# Patient Record
Sex: FEMALE | Race: WHITE | NOT HISPANIC OR LATINO | Employment: FULL TIME | URBAN - METROPOLITAN AREA
[De-identification: names, ages, dates, MRNs, and addresses within clinical notes are randomized per-mention and may not be internally consistent; named-entity substitution may affect disease eponyms.]

---

## 2023-12-08 ENCOUNTER — OFFICE VISIT (OUTPATIENT)
Dept: FAMILY MEDICINE CLINIC | Facility: CLINIC | Age: 33
End: 2023-12-08
Payer: COMMERCIAL

## 2023-12-08 VITALS
SYSTOLIC BLOOD PRESSURE: 108 MMHG | BODY MASS INDEX: 23.55 KG/M2 | TEMPERATURE: 98 F | DIASTOLIC BLOOD PRESSURE: 70 MMHG | OXYGEN SATURATION: 99 % | HEART RATE: 58 BPM | HEIGHT: 62 IN | RESPIRATION RATE: 12 BRPM | WEIGHT: 128 LBS

## 2023-12-08 DIAGNOSIS — L30.9 ECZEMA, UNSPECIFIED TYPE: Primary | ICD-10-CM

## 2023-12-08 DIAGNOSIS — K21.9 GASTROESOPHAGEAL REFLUX DISEASE WITHOUT ESOPHAGITIS: ICD-10-CM

## 2023-12-08 DIAGNOSIS — F41.1 GAD (GENERALIZED ANXIETY DISORDER): ICD-10-CM

## 2023-12-08 RX ORDER — OMEPRAZOLE 40 MG/1
40 CAPSULE, DELAYED RELEASE ORAL DAILY
Start: 2023-12-08

## 2023-12-08 RX ORDER — FLUOXETINE HYDROCHLORIDE 20 MG/1
20 CAPSULE ORAL DAILY
Start: 2023-12-08

## 2023-12-08 RX ORDER — FLUOXETINE HYDROCHLORIDE 20 MG/1
20 CAPSULE ORAL DAILY
COMMUNITY
Start: 2023-09-21 | End: 2023-12-08 | Stop reason: SDUPTHER

## 2023-12-08 RX ORDER — OMEPRAZOLE 40 MG/1
40 CAPSULE, DELAYED RELEASE ORAL DAILY
COMMUNITY
End: 2023-12-08 | Stop reason: SDUPTHER

## 2023-12-08 RX ORDER — MOMETASONE FUROATE 1 MG/G
1 CREAM TOPICAL DAILY
Qty: 45 G | Refills: 1 | Status: SHIPPED | OUTPATIENT
Start: 2023-12-08

## 2023-12-08 NOTE — PROGRESS NOTES
Date: 2023   Patient Name: Aris Leary  : 1990   MRN: [id number]     Chief Complaint:    Chief Complaint   Patient presents with    Establish Care       History of Present Illness: Aris Leary is a 33 y.o. female who is here today to establish care.  Patient is overall doing well.  She is taking her medications as prescribed for generalized anxiety and acid reflux.  She is also needing a refill on her mometasone cream she uses this for eczema cream.  She denies any acute complaints today in clinic.  She is  to same-sex partner.  They are in the process of fertility to carry a baby.  She is due for a Pap smear but is going to talk to fertility clinic to see if they will do her Pap smear for her.  She is not due for blood work at this time and fertility clinic will be faxing blood work from previous visit to me.          Review of Systems:   Review of Systems   Constitutional:  Negative for activity change, appetite change, fatigue, unexpected weight gain and unexpected weight loss.   HENT:  Negative for congestion, dental problem, ear pain, hearing loss, trouble swallowing and voice change.    Eyes:  Negative for blurred vision, pain and visual disturbance.   Respiratory:  Negative for apnea, cough, chest tightness, shortness of breath and wheezing.    Cardiovascular:  Negative for chest pain, palpitations and leg swelling.   Gastrointestinal:  Negative for abdominal pain, constipation, diarrhea, nausea and GERD.   Endocrine: Negative for polydipsia, polyphagia and polyuria.   Genitourinary:  Negative for decreased libido, dysuria, frequency, hematuria and urinary incontinence.   Musculoskeletal:  Negative for arthralgias, back pain, gait problem, joint swelling, myalgias and neck pain.   Skin:  Negative for dry skin, rash, skin lesions, wound and bruise.   Allergic/Immunologic: Negative for environmental allergies and food allergies.   Neurological:  Negative for dizziness, seizures,  "speech difficulty, weakness, headache, memory problem and confusion.   Psychiatric/Behavioral:  Negative for agitation, behavioral problems, sleep disturbance, depressed mood and stress. The patient is not nervous/anxious.        Past Medical History:   Past Medical History:   Diagnosis Date    Anxiety     Depression     GERD (gastroesophageal reflux disease)        Past Surgical History: History reviewed. No pertinent surgical history.    Family History: History reviewed. No pertinent family history.    Social History:   Social History     Socioeconomic History    Marital status:    Tobacco Use    Smoking status: Never    Smokeless tobacco: Never   Vaping Use    Vaping Use: Never used   Substance and Sexual Activity    Alcohol use: Yes     Comment: Socially    Drug use: Never    Sexual activity: Defer       Medications:     Current Outpatient Medications:     FLUoxetine (PROzac) 20 MG capsule, Take 1 capsule by mouth Daily., Disp: , Rfl:     omeprazole (priLOSEC) 40 MG capsule, Take 1 capsule by mouth Daily., Disp: , Rfl:     mometasone (ELOCON) 0.1 % cream, Apply 1 application  topically to the appropriate area as directed Daily., Disp: 45 g, Rfl: 1    Allergies:   No Known Allergies    PHQ-9 Total Score: 0     Physical Exam:  Vital Signs:   Vitals:    12/08/23 1210   BP: 108/70   Pulse: 58   Resp: 12   Temp: 98 °F (36.7 °C)   SpO2: 99%   Weight: 58.1 kg (128 lb)   Height: 157.5 cm (62\")     Body mass index is 23.41 kg/m².   BMI is within normal parameters. No other follow-up for BMI required.       Physical Exam  Vitals and nursing note reviewed.   Constitutional:       General: She is awake.      Appearance: Normal appearance. She is well-developed.   HENT:      Head: Normocephalic and atraumatic.      Right Ear: Tympanic membrane, ear canal and external ear normal.      Left Ear: Tympanic membrane, ear canal and external ear normal.      Nose: Nose normal.      Mouth/Throat:      Mouth: Mucous membranes " are moist.      Pharynx: Oropharynx is clear.   Eyes:      Extraocular Movements: Extraocular movements intact.      Conjunctiva/sclera: Conjunctivae normal.      Pupils: Pupils are equal, round, and reactive to light.   Cardiovascular:      Rate and Rhythm: Normal rate and regular rhythm.      Pulses: Normal pulses.      Heart sounds: Normal heart sounds.   Pulmonary:      Effort: Pulmonary effort is normal.      Breath sounds: Normal breath sounds.   Abdominal:      General: Bowel sounds are normal.      Palpations: Abdomen is soft.   Musculoskeletal:         General: Normal range of motion.      Cervical back: Normal range of motion and neck supple.      Right lower leg: No edema.      Left lower leg: No edema.   Skin:     General: Skin is warm.      Capillary Refill: Capillary refill takes less than 2 seconds.   Neurological:      General: No focal deficit present.      Mental Status: She is alert and oriented to person, place, and time.   Psychiatric:         Mood and Affect: Mood normal.         Behavior: Behavior normal. Behavior is cooperative.         Thought Content: Thought content normal.         Judgment: Judgment normal.         Procedures     Assessment/Plan:   Diagnoses and all orders for this visit:    1. Eczema, unspecified type (Primary)  -     mometasone (ELOCON) 0.1 % cream; Apply 1 application  topically to the appropriate area as directed Daily.  Dispense: 45 g; Refill: 1    2. JUDITH (generalized anxiety disorder)  -     FLUoxetine (PROzac) 20 MG capsule; Take 1 capsule by mouth Daily.    3. Gastroesophageal reflux disease without esophagitis  -     omeprazole (priLOSEC) 40 MG capsule; Take 1 capsule by mouth Daily.       Use cream as prescribed to aid in symptoms   Monitor for worsening symptoms  Avoid triggering factors.    Anxiety and Depression  Take medications as prescribed  Notify if symptoms are worsening   Develop coping mechanisms  Best outcome of improving anx/dep is in conjunction  with therapy/counseling; this was discussed in clinic today and suggested to the patient.    GERD:  Take medications as prescribed  Increase fluid intake  Monitor triggering foods  Decrease caffeine and     Follow Up:   Return for Annual.        Vernell Aguilera. TACHO   [facility]

## 2024-03-22 DIAGNOSIS — F41.1 GAD (GENERALIZED ANXIETY DISORDER): ICD-10-CM

## 2024-03-22 DIAGNOSIS — K21.9 GASTROESOPHAGEAL REFLUX DISEASE WITHOUT ESOPHAGITIS: ICD-10-CM

## 2024-03-22 RX ORDER — FLUOXETINE HYDROCHLORIDE 20 MG/1
20 CAPSULE ORAL DAILY
Start: 2024-03-22

## 2024-03-22 RX ORDER — OMEPRAZOLE 40 MG/1
40 CAPSULE, DELAYED RELEASE ORAL DAILY
Start: 2024-03-22

## 2024-03-27 DIAGNOSIS — F41.1 GAD (GENERALIZED ANXIETY DISORDER): ICD-10-CM

## 2024-03-27 DIAGNOSIS — K21.9 GASTROESOPHAGEAL REFLUX DISEASE WITHOUT ESOPHAGITIS: ICD-10-CM

## 2024-03-27 RX ORDER — OMEPRAZOLE 40 MG/1
40 CAPSULE, DELAYED RELEASE ORAL DAILY
Qty: 90 CAPSULE | Refills: 1 | Status: SHIPPED | OUTPATIENT
Start: 2024-03-27

## 2024-03-27 RX ORDER — FLUOXETINE HYDROCHLORIDE 20 MG/1
20 CAPSULE ORAL DAILY
Qty: 90 CAPSULE | Refills: 1 | Status: SHIPPED | OUTPATIENT
Start: 2024-03-27

## 2024-07-02 ENCOUNTER — TELEPHONE (OUTPATIENT)
Dept: FAMILY MEDICINE CLINIC | Facility: CLINIC | Age: 34
End: 2024-07-02
Payer: COMMERCIAL

## 2024-07-02 DIAGNOSIS — K58.0 IRRITABLE BOWEL SYNDROME WITH DIARRHEA: Primary | ICD-10-CM

## 2024-07-02 NOTE — TELEPHONE ENCOUNTER
Caller: Aris Leary    Relationship: Self    Best call back number: [phone]    What medication are you requesting: DICYCLOMINE    What are your current symptoms: STOMACH ISSUES         Have you had these symptoms before:    [x] Yes  [] No    Have you been treated for these symptoms before:   [x] Yes  [] No    If a prescription is needed, what is your preferred pharmacy and phone number: [id number] - [place], [place] - [address] - [phone]  - [phone]      Additional notes: THE PATIENT STATES THAT THE DOCTOR HAS PRESCRIBED THE MEDICATION IN THE PAST SHE WOULD LIKE TO GET A 30 DAY SUPPLY OF THE MEDICATION FOR HER STOMACH ISSUES

## 2024-07-05 RX ORDER — DICYCLOMINE HYDROCHLORIDE 10 MG/1
10 CAPSULE ORAL
Qty: 120 CAPSULE | Refills: 0 | Status: SHIPPED | OUTPATIENT
Start: 2024-07-05

## 2024-09-26 DIAGNOSIS — K21.9 GASTROESOPHAGEAL REFLUX DISEASE WITHOUT ESOPHAGITIS: ICD-10-CM

## 2024-09-26 DIAGNOSIS — F41.1 GAD (GENERALIZED ANXIETY DISORDER): ICD-10-CM

## 2024-09-26 RX ORDER — OMEPRAZOLE 40 MG/1
40 CAPSULE, DELAYED RELEASE ORAL DAILY
Qty: 90 CAPSULE | Refills: 1 | Status: SHIPPED | OUTPATIENT
Start: 2024-09-26

## 2024-10-08 ENCOUNTER — OFFICE VISIT (OUTPATIENT)
Dept: FAMILY MEDICINE CLINIC | Facility: CLINIC | Age: 34
End: 2024-10-08
Payer: COMMERCIAL

## 2024-10-08 VITALS
HEART RATE: 51 BPM | BODY MASS INDEX: 23.81 KG/M2 | OXYGEN SATURATION: 100 % | SYSTOLIC BLOOD PRESSURE: 110 MMHG | DIASTOLIC BLOOD PRESSURE: 68 MMHG | WEIGHT: 129.4 LBS | HEIGHT: 62 IN | TEMPERATURE: 97.5 F | RESPIRATION RATE: 14 BRPM

## 2024-10-08 DIAGNOSIS — M54.41 ACUTE BILATERAL LOW BACK PAIN WITH BILATERAL SCIATICA: Primary | ICD-10-CM

## 2024-10-08 DIAGNOSIS — M54.42 ACUTE BILATERAL LOW BACK PAIN WITH BILATERAL SCIATICA: Primary | ICD-10-CM

## 2024-10-08 PROCEDURE — 96372 THER/PROPH/DIAG INJ SC/IM: CPT | Performed by: NURSE PRACTITIONER

## 2024-10-08 PROCEDURE — 99213 OFFICE O/P EST LOW 20 MIN: CPT | Performed by: NURSE PRACTITIONER

## 2024-10-08 RX ORDER — TRIAMCINOLONE ACETONIDE 40 MG/ML
40 INJECTION, SUSPENSION INTRA-ARTICULAR; INTRAMUSCULAR ONCE
Status: COMPLETED | OUTPATIENT
Start: 2024-10-08 | End: 2024-10-08

## 2024-10-08 RX ORDER — METHYLPREDNISOLONE 4 MG
TABLET, DOSE PACK ORAL
Qty: 21 TABLET | Refills: 0 | Status: SHIPPED | OUTPATIENT
Start: 2024-10-08

## 2024-10-08 RX ORDER — METHOCARBAMOL 750 MG/1
750 TABLET, FILM COATED ORAL 3 TIMES DAILY
Qty: 21 TABLET | Refills: 0 | Status: SHIPPED | OUTPATIENT
Start: 2024-10-08 | End: 2024-10-15

## 2024-10-08 RX ADMIN — TRIAMCINOLONE ACETONIDE 40 MG: 40 INJECTION, SUSPENSION INTRA-ARTICULAR; INTRAMUSCULAR at 12:15

## 2024-10-08 NOTE — PROGRESS NOTES
"       Date: 10/08/2024   Patient Name: Aris Leary  : 1990   MRN: [id number]     Chief Complaint:    Chief Complaint   Patient presents with    Back Pain     Since  when riding her horse. They took a jump wrong and it \"jammed\" her back somehow.       History of Present Illness: Aris Leary is a 34 y.o. female who is here today for Was riding her horse on Saturday and  judged a jump and when landing the horse landed awkwardly, making her land in the saddle jamming her lower back.  Since then she has had low back pain with muscle spasms and associated bilateral sciatic nerve pain.  She has been taken Tylenol and ibuprofen PRN to aid in symptoms with minimal relief.  She is also been using a lidocaine patch.  She denies any lower extremity weakness, loss of bowel or bladder function.             Review of Systems:   Review of Systems   Musculoskeletal:  Positive for back pain.       Past Medical History:   Past Medical History:   Diagnosis Date    Anxiety     Depression     GERD (gastroesophageal reflux disease)        Past Surgical History: History reviewed. No pertinent surgical history.    Family History: History reviewed. No pertinent family history.    Social History:   Social History     Socioeconomic History    Marital status:    Tobacco Use    Smoking status: Never    Smokeless tobacco: Never   Vaping Use    Vaping status: Never Used   Substance and Sexual Activity    Alcohol use: Yes     Comment: Socially    Drug use: Never    Sexual activity: Defer       Medications:     Current Outpatient Medications:     FLUoxetine (PROzac) 20 MG capsule, TAKE 1 CAPSULE BY MOUTH EVERY DAY, Disp: 90 capsule, Rfl: 1    omeprazole (priLOSEC) 40 MG capsule, TAKE 1 CAPSULE BY MOUTH EVERY DAY, Disp: 90 capsule, Rfl: 1    methocarbamol (ROBAXIN) 750 MG tablet, Take 1 tablet by mouth 3 (Three) Times a Day for 7 days., Disp: 21 tablet, Rfl: 0    methylPREDNISolone (MEDROL) 4 MG dose pack, Take as " "directed on package instructions., Disp: 21 tablet, Rfl: 0  No current facility-administered medications for this visit.    Allergies:   No Known Allergies      Physical Exam:  Vital Signs:   Vitals:    10/08/24 1113   BP: 110/68   Pulse: 51   Resp: 14   Temp: 97.5 °F (36.4 °C)   SpO2: 100%   Weight: 58.7 kg (129 lb 6.4 oz)   Height: 157.5 cm (62\")     Body mass index is 23.67 kg/m².     Physical Exam  Vitals and nursing note reviewed.   Constitutional:       Appearance: Normal appearance.   HENT:      Head: Normocephalic and atraumatic.   Cardiovascular:      Rate and Rhythm: Normal rate and regular rhythm.   Pulmonary:      Effort: Pulmonary effort is normal.      Breath sounds: Normal breath sounds.   Abdominal:      General: Bowel sounds are normal.   Musculoskeletal:      Cervical back: Normal.      Thoracic back: Normal.      Lumbar back: Spasms and tenderness present. Decreased range of motion.   Skin:     General: Skin is warm.   Neurological:      General: No focal deficit present.      Mental Status: She is alert and oriented to person, place, and time.   Psychiatric:         Mood and Affect: Mood normal.           Assessment/Plan:   Diagnoses and all orders for this visit:    1. Acute bilateral low back pain with bilateral sciatica (Primary)  -     triamcinolone acetonide (KENALOG-40) injection 40 mg  -     methylPREDNISolone (MEDROL) 4 MG dose pack; Take as directed on package instructions.  Dispense: 21 tablet; Refill: 0  -     methocarbamol (ROBAXIN) 750 MG tablet; Take 1 tablet by mouth 3 (Three) Times a Day for 7 days.  Dispense: 21 tablet; Refill: 0       Back pain education  Back exercising and stretching discussed in clinic  UA not indicated due to traumatic event  Monitor for worsening symptoms  Go to the ER with severe symptoms, loss of function of lower extremities, numbness or tingling present in lower extremities, loss of bowel or bladder function  Take medications as prescribed within " chart.      Follow Up:   Return if symptoms worsen or fail to improve.    Vernell Aguilera. TACHO   [facility]

## 2025-03-22 DIAGNOSIS — F41.1 GAD (GENERALIZED ANXIETY DISORDER): ICD-10-CM

## 2025-05-12 ENCOUNTER — OFFICE VISIT (OUTPATIENT)
Dept: FAMILY MEDICINE CLINIC | Facility: CLINIC | Age: 35
End: 2025-05-12
Payer: COMMERCIAL

## 2025-05-12 VITALS
SYSTOLIC BLOOD PRESSURE: 110 MMHG | DIASTOLIC BLOOD PRESSURE: 62 MMHG | WEIGHT: 129.6 LBS | OXYGEN SATURATION: 100 % | BODY MASS INDEX: 23.85 KG/M2 | HEART RATE: 74 BPM | RESPIRATION RATE: 14 BRPM | TEMPERATURE: 97.5 F | HEIGHT: 62 IN

## 2025-05-12 DIAGNOSIS — M54.50 LOW BACK PAIN POTENTIALLY ASSOCIATED WITH RADICULOPATHY: Primary | ICD-10-CM

## 2025-05-12 PROCEDURE — 99213 OFFICE O/P EST LOW 20 MIN: CPT | Performed by: NURSE PRACTITIONER

## 2025-05-12 PROCEDURE — 96372 THER/PROPH/DIAG INJ SC/IM: CPT | Performed by: NURSE PRACTITIONER

## 2025-05-12 RX ORDER — TRIAMCINOLONE ACETONIDE 40 MG/ML
40 INJECTION, SUSPENSION INTRA-ARTICULAR; INTRAMUSCULAR ONCE
Status: COMPLETED | OUTPATIENT
Start: 2025-05-12 | End: 2025-05-12

## 2025-05-12 RX ORDER — METHYLPREDNISOLONE 4 MG/1
TABLET ORAL
Qty: 21 TABLET | Refills: 0 | Status: SHIPPED | OUTPATIENT
Start: 2025-05-12

## 2025-05-12 RX ORDER — METHOCARBAMOL 750 MG/1
750 TABLET, FILM COATED ORAL 3 TIMES DAILY
Qty: 21 TABLET | Refills: 0 | Status: SHIPPED | OUTPATIENT
Start: 2025-05-12 | End: 2025-05-19

## 2025-05-12 RX ADMIN — TRIAMCINOLONE ACETONIDE 40 MG: 40 INJECTION, SUSPENSION INTRA-ARTICULAR; INTRAMUSCULAR at 16:45

## 2025-05-12 NOTE — PROGRESS NOTES
Date: 2025   Patient Name: Aris Leary  : 1990   MRN: [id number]     Chief Complaint:    Chief Complaint   Patient presents with    Back Pain     Low back since yesterday. She was putting [employer] down on Saturday and tweaked it.        History of Present Illness: Aris Leary is a 34 y.o. female who is here today to follow up for HPI       History of Present Illness  The patient presents for evaluation of back pain.    She reports an incident where she was assisting her father with [employer] work, during which she used her shoulder to move an object. Initially, she did not perceive any issues; however, after riding yesterday, she experienced significant back pain that has since prevented her from standing upright. The pain is localized across her back and radiates down both legs, extending to her knees. She also reports persistent spasms. She has a history of similar episodes over the years, typically triggered by physical activities. She recalls a previous consultation in 10/2024, during which she received a steroid injection and was prescribed muscle relaxants. She has been utilizing lidocaine patches from her previous treatment, which have provided some relief but have not completely alleviated the pain. She has been performing exercises recommended by a physical therapist friend and usually stretches every night after riding. She reports no urinary discomfort. She had a yeast infection a while ago, but her OB/GYN treated it.       Review of Systems:   Review of Systems   Musculoskeletal:  Positive for back pain.       I have reviewed the patients family history, social history, past medical history, past surgical history and have updated it as appropriate.     Medications:     Current Outpatient Medications:     FLUoxetine (PROzac) 20 MG capsule, TAKE 1 CAPSULE BY MOUTH EVERY DAY, Disp: 90 capsule, Rfl: 1    omeprazole (priLOSEC) 40 MG capsule, TAKE 1 CAPSULE BY MOUTH EVERY DAY, Disp: 90  "capsule, Rfl: 1    methocarbamol (ROBAXIN) 750 MG tablet, Take 1 tablet by mouth 3 (Three) Times a Day for 7 days., Disp: 21 tablet, Rfl: 0    methylPREDNISolone (MEDROL) 4 MG dose pack, Take as directed on package instructions., Disp: 21 tablet, Rfl: 0  No current facility-administered medications for this visit.    Allergies:   No Known Allergies    PHQ-9 Total Score:      Physical Exam:  Vital Signs:   Vitals:    05/12/25 1624   BP: 110/62   Pulse: 74   Resp: 14   Temp: 97.5 °F (36.4 °C)   SpO2: 100%   Weight: 58.8 kg (129 lb 9.6 oz)   Height: 157.5 cm (62\")     Body mass index is 23.7 kg/m².   BMI is within normal parameters. No other follow-up for BMI required.       Physical Exam  Vitals and nursing note reviewed.   Constitutional:       Appearance: Normal appearance.   HENT:      Head: Normocephalic and atraumatic.   Cardiovascular:      Rate and Rhythm: Normal rate and regular rhythm.   Pulmonary:      Effort: Pulmonary effort is normal.      Breath sounds: Normal breath sounds.   Musculoskeletal:      Cervical back: Normal.      Thoracic back: Normal.      Lumbar back: Tenderness present. Decreased range of motion.   Skin:     General: Skin is warm.   Neurological:      Mental Status: She is alert and oriented to person, place, and time.           Assessment/Plan:   Diagnoses and all orders for this visit:    1. Low back pain potentially associated with radiculopathy (Primary)  -     triamcinolone acetonide (KENALOG-40) injection 40 mg  -     methylPREDNISolone (MEDROL) 4 MG dose pack; Take as directed on package instructions.  Dispense: 21 tablet; Refill: 0  -     methocarbamol (ROBAXIN) 750 MG tablet; Take 1 tablet by mouth 3 (Three) Times a Day for 7 days.  Dispense: 21 tablet; Refill: 0         Assessment & Plan  1. Back pain.  - Reports acute back pain following an incident of moving a floor with her shoulder.  - Pain is described as spasming and radiating down both legs to the knees.  - History of " similar episodes, with the last occurrence being approximately 8 months ago, treated with a steroid injection and muscle relaxers. Lidocaine patches from the previous episode provided some relief but did not fully alleviate the pain.  - Advised to engage in regular exercises and stretching routines specifically targeting the lower back and piriformis muscle. A steroid injection will be administered today to manage the current pain.    Patient or patient representative verbalized consent for the use of Ambient Listening during the visit with  TACHO Lozano for chart documentation. 5/12/2025  17:24 EDT      Follow Up:   Return if symptoms worsen or fail to improve.      Vernell Aguilera. TACHO   [facility]

## 2025-07-24 DIAGNOSIS — K21.9 GASTROESOPHAGEAL REFLUX DISEASE WITHOUT ESOPHAGITIS: ICD-10-CM

## 2025-07-24 RX ORDER — OMEPRAZOLE 40 MG/1
40 CAPSULE, DELAYED RELEASE ORAL DAILY
Qty: 90 CAPSULE | Refills: 1 | Status: SHIPPED | OUTPATIENT
Start: 2025-07-24